# Patient Record
Sex: FEMALE | Race: OTHER | NOT HISPANIC OR LATINO
[De-identification: names, ages, dates, MRNs, and addresses within clinical notes are randomized per-mention and may not be internally consistent; named-entity substitution may affect disease eponyms.]

---

## 2018-10-02 PROBLEM — Z00.00 ENCOUNTER FOR PREVENTIVE HEALTH EXAMINATION: Status: ACTIVE | Noted: 2018-10-02

## 2018-10-04 ENCOUNTER — APPOINTMENT (OUTPATIENT)
Dept: OPHTHALMOLOGY | Facility: CLINIC | Age: 48
End: 2018-10-04
Payer: COMMERCIAL

## 2018-10-04 PROCEDURE — 92004 COMPRE OPH EXAM NEW PT 1/>: CPT

## 2018-10-04 PROCEDURE — 92133 CPTRZD OPH DX IMG PST SGM ON: CPT

## 2018-10-04 RX ORDER — GABAPENTIN 100 MG/1
100 CAPSULE ORAL
Refills: 0 | Status: ACTIVE | COMMUNITY
Start: 2018-10-04

## 2018-10-04 RX ORDER — GLYCERIN .002; .002; .01 MG/MG; MG/MG; MG/MG
0.2-0.2-1 SOLUTION/ DROPS OPHTHALMIC
Qty: 3 | Refills: 3 | Status: ACTIVE | COMMUNITY
Start: 2018-10-04 | End: 1900-01-01

## 2018-11-15 ENCOUNTER — APPOINTMENT (OUTPATIENT)
Dept: OPHTHALMOLOGY | Facility: CLINIC | Age: 48
End: 2018-11-15
Payer: COMMERCIAL

## 2018-11-15 DIAGNOSIS — H04.129 DRY EYE SYNDROME OF UNSPECIFIED LACRIMAL GLAND: ICD-10-CM

## 2018-11-15 DIAGNOSIS — H40.009 PREGLAUCOMA, UNSPECIFIED, UNSPECIFIED EYE: ICD-10-CM

## 2018-11-15 PROCEDURE — 92012 INTRM OPH EXAM EST PATIENT: CPT

## 2018-11-15 PROCEDURE — ZZZZZ: CPT

## 2018-11-15 PROCEDURE — 92083 EXTENDED VISUAL FIELD XM: CPT

## 2019-03-12 ENCOUNTER — APPOINTMENT (OUTPATIENT)
Dept: OPHTHALMOLOGY | Facility: CLINIC | Age: 49
End: 2019-03-12

## 2023-12-27 ENCOUNTER — APPOINTMENT (OUTPATIENT)
Dept: PULMONOLOGY | Facility: CLINIC | Age: 53
End: 2023-12-27
Payer: COMMERCIAL

## 2023-12-27 VITALS
OXYGEN SATURATION: 97 % | HEIGHT: 61 IN | WEIGHT: 220 LBS | HEART RATE: 109 BPM | BODY MASS INDEX: 41.54 KG/M2 | SYSTOLIC BLOOD PRESSURE: 130 MMHG | DIASTOLIC BLOOD PRESSURE: 70 MMHG | TEMPERATURE: 98 F

## 2023-12-27 DIAGNOSIS — R06.09 OTHER FORMS OF DYSPNEA: ICD-10-CM

## 2023-12-27 DIAGNOSIS — G47.9 SLEEP DISORDER, UNSPECIFIED: ICD-10-CM

## 2023-12-27 PROCEDURE — 94729 DIFFUSING CAPACITY: CPT

## 2023-12-27 PROCEDURE — 99203 OFFICE O/P NEW LOW 30 MIN: CPT | Mod: 25

## 2023-12-27 PROCEDURE — 94727 GAS DIL/WSHOT DETER LNG VOL: CPT

## 2023-12-27 PROCEDURE — 94060 EVALUATION OF WHEEZING: CPT

## 2023-12-27 RX ORDER — ALBUTEROL SULFATE 90 UG/1
108 (90 BASE) INHALANT RESPIRATORY (INHALATION)
Qty: 1 | Refills: 3 | Status: ACTIVE | COMMUNITY
Start: 2023-12-27 | End: 1900-01-01

## 2023-12-27 NOTE — HISTORY OF PRESENT ILLNESS
[Never] : never [TextBox_4] : 53  year old patient presents for evaluation of  .sb and dyspnea on exertion   She has elevated BMI and notes she develops fatigue, dyspnea and chest tightness with mild activity  She notes cough and choking when that wakes her from sleep     The patient has no prior  history of pulmonary problems.  The patient denies history of COPD, asthma, pneumonia, chest pain, postnasal drip,  sinusitis, wheeze, cough, sputum production or shortness of breath.          Primary doctor is Dr Bobby Sanders     PSH:    c section     PMH:    HLD elevated BMI hiatus hernia  preDM  GERD  arthritis, rheumatoid arthritis, fibromyalgia, on gabapentin for 1 month, nehemiah         SH:   never smoker    ETOH:  no     Occupation: [employer]   No exposure to chemicals, dust, asbestos, mold        ALLERGY:   NKDA   environmental/seasonal allergy:  pollen states severe in Spring, Fall (Fall worse)       Review of Systems:   No rash, skin problems No dry eyes no mouth ulcers no sinusitis, sinus infections, nasal obstruction no dysphagia no dry mouth    no pneumonia no wheeze no lung cancer   no CAD no MI no chest pain no murmur no CHF no HTN no edema   no abdominal pain no liver disease    no thyroid disease    fatty liver no bleeding   no DVT or PE   no kidney disease   no stroke no seizure

## 2023-12-27 NOTE — DISCUSSION/SUMMARY
[FreeTextEntry1] : 53 year old woman with dyspnea on exertion elevated BMI as well as symptoms referable to possible obstructive sleep apnea  PFT demonstrated restriction but with positive inhaled bronchodilator response  PLAN trial of bronchodilator  overnight sleep study at home  Agree with workup by cardiology as well  Obtain echo to rule out  pulmonary .HTN  Further recommendations based on results.    Kee Ellison MD

## 2023-12-27 NOTE — PROCEDURE
[FreeTextEntry1] : PFT  demonstrates restriction with diminished total lung capacity 3.2 L73%. FEV1 and FVC are diminished with normal ratio.  The DLCO is normal   There is diminished flow at level of small airways, likely due to restriction  However, there is positive inhaled bronchodilator response suggesting possible element of obstructive airway disease   Kee Ellison MD

## 2023-12-27 NOTE — PHYSICAL EXAM
[No Acute Distress] : no acute distress [Well Nourished] : well nourished [Well Developed] : well developed [Enlarged Base of the Tongue] : enlarged base of the tongue [IV] : Mallampati Class: IV [No JVD] : no jvd [Normal Rate/Rhythm] : normal rate/rhythm [Normal S1, S2] : normal s1, s2 [No Murmurs] : no murmurs [No Resp Distress] : no resp distress [Clear to Auscultation Bilaterally] : clear to auscultation bilaterally [Not Tender] : not tender [Soft] : soft [No Clubbing] : no clubbing [No Edema] : no edema [No Focal Deficits] : no focal deficits [Oriented x3] : oriented x3 [Normal Affect] : normal affect [TextBox_44] : thick [TextBox_68] : diminished aeration  [TextBox_89] : protuberant

## 2024-01-11 ENCOUNTER — APPOINTMENT (OUTPATIENT)
Dept: SLEEP CENTER | Facility: HOME HEALTH | Age: 54
End: 2024-01-11